# Patient Record
Sex: FEMALE | Race: OTHER | HISPANIC OR LATINO
[De-identification: names, ages, dates, MRNs, and addresses within clinical notes are randomized per-mention and may not be internally consistent; named-entity substitution may affect disease eponyms.]

---

## 2020-12-08 ENCOUNTER — TRANSCRIPTION ENCOUNTER (OUTPATIENT)
Age: 85
End: 2020-12-08

## 2021-03-17 ENCOUNTER — TRANSCRIPTION ENCOUNTER (OUTPATIENT)
Age: 86
End: 2021-03-17

## 2021-07-16 ENCOUNTER — EMERGENCY (EMERGENCY)
Facility: HOSPITAL | Age: 86
LOS: 0 days | Discharge: HOME | End: 2021-07-17
Attending: STUDENT IN AN ORGANIZED HEALTH CARE EDUCATION/TRAINING PROGRAM
Payer: MEDICARE

## 2021-07-16 VITALS
DIASTOLIC BLOOD PRESSURE: 96 MMHG | WEIGHT: 104.94 LBS | TEMPERATURE: 98 F | RESPIRATION RATE: 20 BRPM | OXYGEN SATURATION: 98 % | HEIGHT: 59 IN | HEART RATE: 67 BPM | SYSTOLIC BLOOD PRESSURE: 180 MMHG

## 2021-07-16 DIAGNOSIS — S81.812A LACERATION WITHOUT FOREIGN BODY, LEFT LOWER LEG, INITIAL ENCOUNTER: ICD-10-CM
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
## 2021-07-16 DIAGNOSIS — M79.641 PAIN IN RIGHT HAND: ICD-10-CM

## 2021-07-16 DIAGNOSIS — I10 ESSENTIAL (PRIMARY) HYPERTENSION: ICD-10-CM

## 2021-07-16 DIAGNOSIS — Z79.02 LONG TERM (CURRENT) USE OF ANTITHROMBOTICS/ANTIPLATELETS: ICD-10-CM

## 2021-07-16 DIAGNOSIS — E78.5 HYPERLIPIDEMIA, UNSPECIFIED: ICD-10-CM

## 2021-07-16 DIAGNOSIS — M25.531 PAIN IN RIGHT WRIST: ICD-10-CM

## 2021-07-16 DIAGNOSIS — S61.411A LACERATION WITHOUT FOREIGN BODY OF RIGHT HAND, INITIAL ENCOUNTER: ICD-10-CM

## 2021-07-16 DIAGNOSIS — I25.2 OLD MYOCARDIAL INFARCTION: ICD-10-CM

## 2021-07-16 DIAGNOSIS — Z23 ENCOUNTER FOR IMMUNIZATION: ICD-10-CM

## 2021-07-16 DIAGNOSIS — Z86.73 PERSONAL HISTORY OF TRANSIENT ISCHEMIC ATTACK (TIA), AND CEREBRAL INFARCTION WITHOUT RESIDUAL DEFICITS: ICD-10-CM
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
## 2021-07-16 PROCEDURE — 99284 EMERGENCY DEPT VISIT MOD MDM: CPT

## 2021-07-16 RX ORDER — METOPROLOL TARTRATE 50 MG
0 TABLET ORAL
Qty: 0 | Refills: 0 | DISCHARGE

## 2021-07-16 RX ORDER — DEXLANSOPRAZOLE 30 MG/1
0 CAPSULE, DELAYED RELEASE ORAL
Qty: 0 | Refills: 0 | DISCHARGE

## 2021-07-16 RX ORDER — CLOPIDOGREL BISULFATE 75 MG/1
1 TABLET, FILM COATED ORAL
Qty: 0 | Refills: 0 | DISCHARGE

## 2021-07-16 RX ORDER — TETANUS TOXOID, REDUCED DIPHTHERIA TOXOID AND ACELLULAR PERTUSSIS VACCINE, ADSORBED 5; 2.5; 8; 8; 2.5 [IU]/.5ML; [IU]/.5ML; UG/.5ML; UG/.5ML; UG/.5ML
0.5 SUSPENSION INTRAMUSCULAR ONCE
Refills: 0 | Status: COMPLETED | OUTPATIENT
Start: 2021-07-16 | End: 2021-07-16

## 2021-07-16 RX ORDER — QUETIAPINE FUMARATE 200 MG/1
0 TABLET, FILM COATED ORAL
Qty: 0 | Refills: 0 | DISCHARGE

## 2021-07-16 RX ORDER — OLMESARTAN MEDOXOMIL 5 MG/1
1 TABLET, FILM COATED ORAL
Qty: 0 | Refills: 0 | DISCHARGE

## 2021-07-16 RX ADMIN — TETANUS TOXOID, REDUCED DIPHTHERIA TOXOID AND ACELLULAR PERTUSSIS VACCINE, ADSORBED 0.5 MILLILITER(S): 5; 2.5; 8; 8; 2.5 SUSPENSION INTRAMUSCULAR at 23:23

## 2021-07-16 NOTE — ED PROVIDER NOTE - PATIENT PORTAL LINK FT
You can access the FollowMyHealth Patient Portal offered by [facility] by registering at the following website: [email or web address]. By joining [employer]’s FollowMyHealth portal, you will also be able to view your health information using other applications (apps) compatible with our system.

## 2021-07-16 NOTE — ED PROVIDER NOTE - NSFOLLOWUPINSTRUCTIONS_ED_ALL_ED_FT
Please return to Emergency Department immediately for any new, concerning, or worsening symptoms.   Please follow-up with Hand surgery as recommended.

## 2021-07-16 NOTE — ED PROVIDER NOTE - OBJECTIVE STATEMENT
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 Patient lives with daughter who is at bedside. Reports that patient was sitting on a barstool at [facility], tried to get off and lost her balance, falling forward. Denies head injury or LOC. States Patient complained of rigth hand pain and superficial laceration and left lower leg superrficial laceration. Daughter reports she immediately heolped patient stand up, patient was able to ambulate. Unsure date of last tetanus. Denies any change in behavior since the fall.

## 2021-07-16 NOTE — ED ADULT NURSE NOTE - OBJECTIVE STATEMENT
Alert and oriented with hx of dementia accompanied with daughter, presents to ED for s/p fall today around 2pm, witnessed by daughter who states that pt slide off a stool, pt daughter denies hitting head or LOC. pt daughter states pt at her baseline mental status at this time. pt denies any pain or discomfort at this time. pt daughter denies n/v/cp/sob.

## 2021-07-16 NOTE — ED PROVIDER NOTE - CARE PROVIDER_API CALL
Estrella Vang)  Plastic Surgery  [address], [address]  [place] [address]  Phone: [phone]  Fax: [phone]  Follow Up Time: 1-3 Days    Chet Mckenzie)  Surgery  [address], [address]  [place] [address]  Phone: [phone]  Fax: [phone]  Follow Up Time: 1-3 Days

## 2021-07-16 NOTE — ED PROVIDER NOTE - PROGRESS NOTE DETAILS
Daughter and I had an extensive conversation about possibility of ICH s/p fall with eldferly patient on plavix. Daughter understands risks but states she DOES NOT want patient to go through CT as patient's goals of care are Daughter and I had an extensive conversation about possibility of ICH s/p fall with eldfery patient on plavix. Daughter understands risks and intially was agreeable but when transporter arrived to take patient to scan states she DOES NOT want patient to go through CT. Requesting to forego CT imaging as patient's goals of care are that patient would have minimal intervention. Ct order cancelled. Daughter reports patient is at baseline mental status at this time. Daughter requesting that only xray imaging of the hand and wrist be performed and xray imaging of LE be cancelled as patient is now no longer complaining of pain in legs. I called and spoke with radiologist who reviewed xray imaging of hand and wrist with me. States possible old avulsion ulnar bone but no acute fractures or dislocations noted Daughter and I had an extensive conversation about possibility of ICH s/p fall with eldfery patient on plavix. Daughter understands risks and intially was agreeable but when transporter arrived to take patient to scan states she DOES NOT want patient to go through CT. Requesting to forego CT imaging as patient's goals of care are that patient would have minimal intervention. Daughter reports patient is at baseline mental status at this time.  We made shared decision to cancel Ct order.

## 2021-07-16 NOTE — ED PROVIDER NOTE - CARE PLAN
Principal Discharge DX:	Skin tear of left lower leg without complication  Secondary Diagnosis:	Right wrist pain  Secondary Diagnosis:	Skin tear of right hand without complication  Secondary Diagnosis:	Right hand pain

## 2021-07-16 NOTE — ED PROVIDER NOTE - PROVIDER TOKENS
PROVIDER:[TOKEN:[[id number]],FOLLOWUP:[1-3 Days]],PROVIDER:[TOKEN:[[id number]],FOLLOWUP:[1-3 Days]]

## 2021-07-16 NOTE — ED ADULT TRIAGE NOTE - CHIEF COMPLAINT QUOTE
per daughter, pt slid off stool at 1430hrs, daughter denies pt hit her head.  pt  has skin tear on L-LE and R-hand avulsion.  unknown if hit head.  pt on plavix  (PMH-dementia), per daughter, pt slid off stool at 1430hrs, daughter denies pt hit her head.  pt  has skin tear on L-LE and R-hand avulsion, actively bleeding.  unknown if hit head as daughter originally stated she was found on her side on the floor.   pt on plavix  (PMH-dementia),

## 2021-07-16 NOTE — ED ADULT NURSE NOTE - CHIEF COMPLAINT QUOTE
per daughter, pt slid off stool at 1430hrs, daughter denies pt hit her head.  pt  has skin tear on L-LE and R-hand avulsion, actively bleeding.  unknown if hit head as daughter originally stated she was found on her side on the floor.   pt on plavix  (PMH-dementia),

## 2021-07-16 NOTE — ED PROVIDER NOTE - PHYSICAL EXAMINATION
Gen: no acute distress, follows some commands  Cardiac: regular rate and rhythm, +S1S2  Pulm: Clear to auscultation bilaterally  Abd: soft, nontender, nondistended, no guarding  Back: neg CVA ttp, nontender spine  Extremity: RUE:arm is contracted, + ttp hand with skin tear noted dorsal aspect of hand, superficial, thin papery skin, + ecchymosis noted without active bleeding  Neuro: awake, moving all extreemities Gen: no acute distress  Cardiac: regular rate and rhythm, +S1S2  Pulm: Clear to auscultation bilaterally  Abd: soft, nontender, nondistended, no guarding  Back: neg CVA ttp, nontender spine  Extremity: RUE: arm is contracted, + ttp hand with skin tear noted dorsal aspect of hand, superficial, thin papery skin, + ecchymosis noted without active bleeding  BLE LE: +ttp BL knee (pt grimaces and states "ayy") , from, +small superficial skin tear LLE, moving both legs, withdraws from pain  Neuro: awake, moving all extreemities, follows some commands

## 2021-07-17 VITALS — HEART RATE: 68 BPM | SYSTOLIC BLOOD PRESSURE: 153 MMHG | DIASTOLIC BLOOD PRESSURE: 94 MMHG

## 2021-07-17 PROCEDURE — 73110 X-RAY EXAM OF WRIST: CPT | Mod: 26,RT

## 2021-07-17 PROCEDURE — 73130 X-RAY EXAM OF HAND: CPT | Mod: 26,RT

## 2022-12-01 PROBLEM — Z00.00 ENCOUNTER FOR PREVENTIVE HEALTH EXAMINATION: Status: ACTIVE | Noted: 2022-12-01

## 2022-12-02 PROBLEM — I63.9 CEREBRAL INFARCTION, UNSPECIFIED: Chronic | Status: ACTIVE | Noted: 2021-07-16

## 2022-12-02 PROBLEM — E78.5 HYPERLIPIDEMIA, UNSPECIFIED: Chronic | Status: ACTIVE | Noted: 2021-07-16

## 2022-12-02 PROBLEM — I21.9 ACUTE MYOCARDIAL INFARCTION, UNSPECIFIED: Chronic | Status: ACTIVE | Noted: 2021-07-16

## 2022-12-02 PROBLEM — I10 ESSENTIAL (PRIMARY) HYPERTENSION: Chronic | Status: ACTIVE | Noted: 2021-07-16

## 2023-01-24 ENCOUNTER — APPOINTMENT (OUTPATIENT)
Dept: GERIATRICS | Facility: CLINIC | Age: 88
End: 2023-01-24